# Patient Record
(demographics unavailable — no encounter records)

---

## 2025-03-13 NOTE — ADDENDUM
[FreeTextEntry1] :   Documented by Suraj Sheriff acting as scribe for Dr. Marrero on 03/13/2025. All Medical record entries made by the Scribe were at my, Dr. Marrero, direction and personally dictated by me on 03/13/2025 . I have reviewed the chart and agree that the record accurately reflects my personal performance of the history, physical exam, assessment and plan. I have also personally directed, reviewed, and agreed with the discharge instructions.

## 2025-03-13 NOTE — HISTORY OF PRESENT ILLNESS
[de-identified] : 10 month old girl presents for follow up evaluation of lip and tongue tie. Last seen at 35 days old with severe lip tie and mild tongue tie. Mom states Isha is eating and drinking without difficulty. Notes difficulty with moving the top lip. Gaining weight. No noisy breathing. Denies ENT infections.

## 2025-03-13 NOTE — PHYSICAL EXAM
[Clear to Auscultation] : lungs were clear to auscultation bilaterally [Normal Gait and Station] : normal gait and station [Normal muscle strength, symmetry and tone of facial, head and neck musculature] : normal muscle strength, symmetry and tone of facial, head and neck musculature [Normal] : no cervical lymphadenopathy [Exposed Vessel] : left anterior vessel not exposed [Wheezing] : no wheezing [Increased Work of Breathing] : no increased work of breathing with use of accessory muscles and retractions [de-identified] : severe lip tie, moderate tongue tie

## 2025-03-13 NOTE — BIRTH HISTORY
[At ___ Weeks Gestation] : at [unfilled] weeks gestation [ Section] : by  section [None] : No maternal complications [Passed] : passed [de-identified] : needed cpap

## 2025-03-13 NOTE — CONSULT LETTER
[Dear  ___] : Dear  [unfilled], [Please see my note below.] : Please see my note below. [Consult Closing:] : Thank you very much for allowing me to participate in the care of this patient.  If you have any questions, please do not hesitate to contact me. [Sincerely,] : Sincerely, [Courtesy Letter:] : I had the pleasure of seeing your patient, [unfilled], in my office today. [FreeTextEntry2] : Dear Dr. Davis, [FreeTextEntry3] :  Duane Marrero MD, PhD  Chief, Division of Laryngology  Department of Otolaryngology  [facility]  Pediatric Otolaryngology, [facility]   of Otolaryngology  [facility]